# Patient Record
Sex: MALE | Race: WHITE | Employment: FULL TIME | URBAN - METROPOLITAN AREA
[De-identification: names, ages, dates, MRNs, and addresses within clinical notes are randomized per-mention and may not be internally consistent; named-entity substitution may affect disease eponyms.]

---

## 2019-04-01 ENCOUNTER — OFFICE VISIT (OUTPATIENT)
Dept: INTERNAL MEDICINE CLINIC | Age: 44
End: 2019-04-01

## 2019-04-01 VITALS
HEART RATE: 92 BPM | HEIGHT: 72 IN | SYSTOLIC BLOOD PRESSURE: 138 MMHG | DIASTOLIC BLOOD PRESSURE: 86 MMHG | OXYGEN SATURATION: 98 % | WEIGHT: 315 LBS | BODY MASS INDEX: 42.66 KG/M2

## 2019-04-01 DIAGNOSIS — Z13.220 SCREENING FOR CHOLESTEROL LEVEL: ICD-10-CM

## 2019-04-01 DIAGNOSIS — R73.9 HYPERGLYCEMIA: ICD-10-CM

## 2019-04-01 DIAGNOSIS — R53.83 FATIGUE, UNSPECIFIED TYPE: Primary | ICD-10-CM

## 2019-04-01 PROCEDURE — 99203 OFFICE O/P NEW LOW 30 MIN: CPT | Performed by: INTERNAL MEDICINE

## 2019-04-01 ASSESSMENT — PATIENT HEALTH QUESTIONNAIRE - PHQ9
SUM OF ALL RESPONSES TO PHQ QUESTIONS 1-9: 0
SUM OF ALL RESPONSES TO PHQ QUESTIONS 1-9: 0
SUM OF ALL RESPONSES TO PHQ9 QUESTIONS 1 & 2: 0
1. LITTLE INTEREST OR PLEASURE IN DOING THINGS: 0
2. FEELING DOWN, DEPRESSED OR HOPELESS: 0

## 2019-04-01 NOTE — PROGRESS NOTES
Ronda Garcia  YOB: 1975    Date of Service:  4/1/2019    Chief Complaint:      Chief Complaint   Patient presents with    [facility]       HPI:  Ronda Garcia is a 37 y.o. He's complain of feeling tired all the time. He does drink a lot of water during the day. He denies feeling thirsty or polyuria but is concern for diabetes. He has some ? Cholesterol growth on upper eye lids for many years and friend told him to get his cholesterol checked. He has not been watching his diet and been eating [employer]. Lab Results   Component Value Date    LABMICR YES 11/05/2016     Lab Results   Component Value Date     11/05/2016    K 4.3 11/05/2016     11/05/2016    CO2 25 11/05/2016    BUN 22 (H) 11/05/2016    CREATININE 1.1 11/05/2016    GLUCOSE 143 (H) 11/05/2016    CALCIUM 9.3 11/05/2016     No results found for: CHOL, TRIG, HDL, LDLCALC, LDLDIRECT  Lab Results   Component Value Date    ALT 17 11/05/2016    AST 14 (L) 11/05/2016     No results found for: TSH, T4FREE  Lab Results   Component Value Date    WBC 6.9 11/05/2016    HGB 16.3 11/05/2016    HCT 48.2 11/05/2016    MCV 85.4 11/05/2016     11/05/2016     No results found for: INR   No results found for: PSA   No results found for: LABURIC     There is no problem list on file for this patient. No Known Allergies  No outpatient medications have been marked as taking for the 4/1/19 encounter (Office Visit) with Izzy Cedeño MD.         Review of Systems: 14 systems were negative except of what was stated on HPI    Nursing note and vitals reviewed. Vitals:    04/01/19 1253   BP: 138/86   Pulse: 92   SpO2: 98%   Weight: (!) 358 lb (162.4 kg)   Height: 6' (1.829 m)     Wt Readings from Last 3 Encounters:   04/01/19 (!) 358 lb (162.4 kg)   11/05/16 280 lb (127 kg)     BP Readings from Last 3 Encounters:   04/01/19 138/86   11/05/16 138/78     Body mass index is 48.55 kg/m².   Constitutional: Patient appears well-developed and well-nourished. No distress. Head: Normocephalic and atraumatic. Neck: Normal range of motion. Neck supple. No thyroidmegaly. Cardiovascular: Normal rate, regular rhythm, normal heart sounds and intact distal pulses. Pulmonary/Chest: Effort normal and breath sounds normal. No stridor. No respiratory distress. No wheezes and no rales. Abdominal: Soft. Bowel sounds are normal. No distension and no mass. No tenderness. No rebound and no guarding. Musculoskeletal: No edema and no tenderness. Skin: No rash or erythema. Psychiatric: Normal mood and affect. Behavior is normal.     Assessment/Plan:  Marisol Houston was seen today for establish care. Diagnoses and all orders for this visit:    Fatigue, unspecified type  -     Hemoglobin A1C; Future  -     TSH with Reflex; Future    Hyperglycemia  -     Hemoglobin A1C; Future    Screening for cholesterol level  -     Lipid Panel; Future        Return if symptoms worsen or fail to improve.

## 2019-04-01 NOTE — PATIENT INSTRUCTIONS
[address]' financial assistance: [phone]  [facility]' financial assistance: [phone]    Family Size Annual Income Monthly Income Weekly Income  1  $23,760  $1,980   $456.92  2    32,040    2,670     616.15  3    40,320    3,360     775.38  4    48,600    4,050     934.62  5    56,880    4,740   1,093.85  6    65,160    5,430   1,253.08

## 2019-11-12 ENCOUNTER — HOSPITAL ENCOUNTER (EMERGENCY)
Age: 44
Discharge: HOME | End: 2019-11-12
Attending: EMERGENCY MEDICINE

## 2019-11-12 ENCOUNTER — APPOINTMENT (OUTPATIENT)
Dept: CT IMAGING | Age: 44
End: 2019-11-12

## 2019-11-12 VITALS
BODY MASS INDEX: 42.66 KG/M2 | OXYGEN SATURATION: 99 % | WEIGHT: 315 LBS | SYSTOLIC BLOOD PRESSURE: 174 MMHG | DIASTOLIC BLOOD PRESSURE: 80 MMHG | HEART RATE: 68 BPM | RESPIRATION RATE: 20 BRPM | TEMPERATURE: 97.9 F | HEIGHT: 72 IN

## 2019-11-12 DIAGNOSIS — N20.0 KIDNEY STONE: Primary | ICD-10-CM

## 2019-11-12 LAB
A/G RATIO: 1.1 (ref 1.1–2.2)
ALBUMIN SERPL-MCNC: 4 G/DL (ref 3.4–5)
ALP BLD-CCNC: 71 U/L (ref 40–129)
ALT SERPL-CCNC: 17 U/L (ref 10–40)
ANION GAP SERPL CALCULATED.3IONS-SCNC: 12 MMOL/L (ref 3–16)
AST SERPL-CCNC: 16 U/L (ref 15–37)
BACTERIA: ABNORMAL /HPF
BASOPHILS ABSOLUTE: 0.1 K/UL (ref 0–0.2)
BASOPHILS RELATIVE PERCENT: 0.9 %
BILIRUB SERPL-MCNC: 0.4 MG/DL (ref 0–1)
BILIRUBIN URINE: NEGATIVE
BLOOD, URINE: ABNORMAL
BUN BLDV-MCNC: 17 MG/DL (ref 7–20)
CALCIUM SERPL-MCNC: 8.9 MG/DL (ref 8.3–10.6)
CHLORIDE BLD-SCNC: 101 MMOL/L (ref 99–110)
CLARITY: CLEAR
CO2: 24 MMOL/L (ref 21–32)
COLOR: YELLOW
CREAT SERPL-MCNC: 1.2 MG/DL (ref 0.9–1.3)
EOSINOPHILS ABSOLUTE: 0.1 K/UL (ref 0–0.6)
EOSINOPHILS RELATIVE PERCENT: 0.6 %
GFR AFRICAN AMERICAN: >60
GFR NON-AFRICAN AMERICAN: >60
GLOBULIN: 3.7 G/DL
GLUCOSE BLD-MCNC: 149 MG/DL (ref 70–99)
GLUCOSE URINE: NEGATIVE MG/DL
HCT VFR BLD CALC: 48.4 % (ref 40.5–52.5)
HEMOGLOBIN: 16.6 G/DL (ref 13.5–17.5)
KETONES, URINE: ABNORMAL MG/DL
LEUKOCYTE ESTERASE, URINE: NEGATIVE
LYMPHOCYTES ABSOLUTE: 1 K/UL (ref 1–5.1)
LYMPHOCYTES RELATIVE PERCENT: 10.1 %
MCH RBC QN AUTO: 29.2 PG (ref 26–34)
MCHC RBC AUTO-ENTMCNC: 34.3 G/DL (ref 31–36)
MCV RBC AUTO: 85.2 FL (ref 80–100)
MICROSCOPIC EXAMINATION: YES
MONOCYTES ABSOLUTE: 0.4 K/UL (ref 0–1.3)
MONOCYTES RELATIVE PERCENT: 4.4 %
NEUTROPHILS ABSOLUTE: 8.5 K/UL (ref 1.7–7.7)
NEUTROPHILS RELATIVE PERCENT: 84 %
NITRITE, URINE: NEGATIVE
PDW BLD-RTO: 13.5 % (ref 12.4–15.4)
PH UA: 6 (ref 5–8)
PLATELET # BLD: 219 K/UL (ref 135–450)
PMV BLD AUTO: 8.1 FL (ref 5–10.5)
POTASSIUM REFLEX MAGNESIUM: 4.3 MMOL/L (ref 3.5–5.1)
PROTEIN UA: ABNORMAL MG/DL
RBC # BLD: 5.68 M/UL (ref 4.2–5.9)
RBC UA: ABNORMAL /HPF (ref 0–2)
SODIUM BLD-SCNC: 137 MMOL/L (ref 136–145)
SPECIFIC GRAVITY UA: 1.02 (ref 1–1.03)
TOTAL PROTEIN: 7.7 G/DL (ref 6.4–8.2)
URINE REFLEX TO CULTURE: YES
URINE TYPE: ABNORMAL
UROBILINOGEN, URINE: 0.2 E.U./DL
WBC # BLD: 10.1 K/UL (ref 4–11)
WBC UA: ABNORMAL /HPF (ref 0–5)

## 2019-11-12 PROCEDURE — 81001 URINALYSIS AUTO W/SCOPE: CPT

## 2019-11-12 PROCEDURE — 80053 COMPREHEN METABOLIC PANEL: CPT

## 2019-11-12 PROCEDURE — 96374 THER/PROPH/DIAG INJ IV PUSH: CPT

## 2019-11-12 PROCEDURE — 36415 COLL VENOUS BLD VENIPUNCTURE: CPT

## 2019-11-12 PROCEDURE — 85025 COMPLETE CBC W/AUTO DIFF WBC: CPT

## 2019-11-12 PROCEDURE — 96375 TX/PRO/DX INJ NEW DRUG ADDON: CPT

## 2019-11-12 PROCEDURE — 2580000003 HC RX 258: Performed by: EMERGENCY MEDICINE

## 2019-11-12 PROCEDURE — 96361 HYDRATE IV INFUSION ADD-ON: CPT

## 2019-11-12 PROCEDURE — 99284 EMERGENCY DEPT VISIT MOD MDM: CPT

## 2019-11-12 PROCEDURE — 6360000002 HC RX W HCPCS: Performed by: EMERGENCY MEDICINE

## 2019-11-12 PROCEDURE — 74176 CT ABD & PELVIS W/O CONTRAST: CPT

## 2019-11-12 RX ORDER — CEPHALEXIN 500 MG/1
500 CAPSULE ORAL 4 TIMES DAILY
Qty: 40 CAPSULE | Refills: 0 | Status: SHIPPED | OUTPATIENT
Start: 2019-11-12 | End: 2021-05-15

## 2019-11-12 RX ORDER — 0.9 % SODIUM CHLORIDE 0.9 %
1000 INTRAVENOUS SOLUTION INTRAVENOUS ONCE
Status: COMPLETED | OUTPATIENT
Start: 2019-11-12 | End: 2019-11-12

## 2019-11-12 RX ORDER — CEPHALEXIN 500 MG/1
500 CAPSULE ORAL EVERY 6 HOURS SCHEDULED
Status: DISCONTINUED | OUTPATIENT
Start: 2019-11-12 | End: 2019-11-12

## 2019-11-12 RX ORDER — KETOROLAC TROMETHAMINE 30 MG/ML
30 INJECTION, SOLUTION INTRAMUSCULAR; INTRAVENOUS ONCE
Status: COMPLETED | OUTPATIENT
Start: 2019-11-12 | End: 2019-11-12

## 2019-11-12 RX ORDER — ONDANSETRON 2 MG/ML
4 INJECTION INTRAMUSCULAR; INTRAVENOUS EVERY 30 MIN PRN
Status: DISCONTINUED | OUTPATIENT
Start: 2019-11-12 | End: 2019-11-12 | Stop reason: HOSPADM

## 2019-11-12 RX ORDER — IBUPROFEN 600 MG/1
600 TABLET ORAL EVERY 6 HOURS PRN
Qty: 15 TABLET | Refills: 0 | Status: SHIPPED | OUTPATIENT
Start: 2019-11-12 | End: 2021-05-15

## 2019-11-12 RX ADMIN — CEFTRIAXONE SODIUM 1 G: 1 INJECTION, POWDER, FOR SOLUTION INTRAMUSCULAR; INTRAVENOUS at 07:26

## 2019-11-12 RX ADMIN — KETOROLAC TROMETHAMINE 30 MG: 30 INJECTION, SOLUTION INTRAMUSCULAR; INTRAVENOUS at 06:56

## 2019-11-12 RX ADMIN — SODIUM CHLORIDE 1000 ML: 9 INJECTION, SOLUTION INTRAVENOUS at 07:26

## 2019-11-12 RX ADMIN — ONDANSETRON HYDROCHLORIDE 4 MG: 2 INJECTION, SOLUTION INTRAMUSCULAR; INTRAVENOUS at 06:56

## 2019-11-12 ASSESSMENT — ENCOUNTER SYMPTOMS
SHORTNESS OF BREATH: 0
VOMITING: 1
SORE THROAT: 0

## 2019-11-12 ASSESSMENT — PAIN DESCRIPTION - PAIN TYPE
TYPE: ACUTE PAIN

## 2019-11-12 ASSESSMENT — PAIN DESCRIPTION - LOCATION
LOCATION: FLANK

## 2019-11-12 ASSESSMENT — PAIN SCALES - GENERAL
PAINLEVEL_OUTOF10: 2
PAINLEVEL_OUTOF10: 0
PAINLEVEL_OUTOF10: 8
PAINLEVEL_OUTOF10: 8

## 2019-11-12 ASSESSMENT — PAIN DESCRIPTION - DESCRIPTORS
DESCRIPTORS: ACHING
DESCRIPTORS: ACHING;DISCOMFORT

## 2019-11-12 ASSESSMENT — PAIN DESCRIPTION - ORIENTATION
ORIENTATION: LEFT
ORIENTATION: LEFT

## 2021-05-15 ENCOUNTER — HOSPITAL ENCOUNTER (EMERGENCY)
Age: 46
Discharge: HOME | End: 2021-05-16
Attending: EMERGENCY MEDICINE
Payer: COMMERCIAL

## 2021-05-15 ENCOUNTER — APPOINTMENT (OUTPATIENT)
Dept: GENERAL RADIOLOGY | Age: 46
End: 2021-05-15
Payer: COMMERCIAL

## 2021-05-15 VITALS
WEIGHT: 315 LBS | HEART RATE: 89 BPM | BODY MASS INDEX: 42.66 KG/M2 | RESPIRATION RATE: 18 BRPM | TEMPERATURE: 98.4 F | HEIGHT: 72 IN | DIASTOLIC BLOOD PRESSURE: 64 MMHG | SYSTOLIC BLOOD PRESSURE: 133 MMHG | OXYGEN SATURATION: 97 %

## 2021-05-15 DIAGNOSIS — R07.9 INTERMITTENT CHEST PAIN: ICD-10-CM

## 2021-05-15 DIAGNOSIS — F17.200 SMOKER: ICD-10-CM

## 2021-05-15 DIAGNOSIS — R03.0 ELEVATED BLOOD PRESSURE READING: ICD-10-CM

## 2021-05-15 DIAGNOSIS — E66.01 OBESITY, MORBID (HCC): ICD-10-CM

## 2021-05-15 DIAGNOSIS — F41.1 ANXIETY STATE: Primary | ICD-10-CM

## 2021-05-15 DIAGNOSIS — R06.09 DYSPNEA ON EXERTION: ICD-10-CM

## 2021-05-15 PROCEDURE — 93005 ELECTROCARDIOGRAM TRACING: CPT | Performed by: EMERGENCY MEDICINE

## 2021-05-15 PROCEDURE — 6370000000 HC RX 637 (ALT 250 FOR IP): Performed by: EMERGENCY MEDICINE

## 2021-05-15 PROCEDURE — 99285 EMERGENCY DEPT VISIT HI MDM: CPT

## 2021-05-15 PROCEDURE — 71045 X-RAY EXAM CHEST 1 VIEW: CPT

## 2021-05-15 RX ORDER — ASPIRIN 81 MG/1
324 TABLET, CHEWABLE ORAL ONCE
Status: COMPLETED | OUTPATIENT
Start: 2021-05-15 | End: 2021-05-15

## 2021-05-15 RX ORDER — HYDROXYZINE PAMOATE 50 MG/1
50 CAPSULE ORAL ONCE
Status: COMPLETED | OUTPATIENT
Start: 2021-05-15 | End: 2021-05-15

## 2021-05-15 RX ADMIN — ASPIRIN 324 MG: 81 TABLET, CHEWABLE ORAL at 23:12

## 2021-05-15 RX ADMIN — HYDROXYZINE PAMOATE 50 MG: 50 CAPSULE ORAL at 23:13

## 2021-05-16 LAB
EKG ATRIAL RATE: 83 BPM
EKG DIAGNOSIS: NORMAL
EKG P AXIS: 29 DEGREES
EKG P-R INTERVAL: 144 MS
EKG Q-T INTERVAL: 360 MS
EKG QRS DURATION: 88 MS
EKG QTC CALCULATION (BAZETT): 423 MS
EKG R AXIS: 42 DEGREES
EKG T AXIS: 34 DEGREES
EKG VENTRICULAR RATE: 83 BPM

## 2021-05-16 PROCEDURE — 93010 ELECTROCARDIOGRAM REPORT: CPT | Performed by: INTERNAL MEDICINE

## 2021-05-16 NOTE — ED PROVIDER NOTES
CHIEF COMPLAINT  Anxiety (Pt brought in by [facility] after being arrested during a \"domestic violence call\". Pt sts \"my B/P is high, had an attack while they were there\")      HISTORY OF PRESENT ILLNESS  Ivette Cheatham is a 50 y.o. male presents to the ED brought in by EMS, with anxiety attack, reports there was a misunderstanding, he was arrested after a domestic violence call. Police here w/ him, patient is concerned his BP is high, had an episode of chest tightness/SOB, he states he is afraid they are going to put him in a cell alone and his anxiety will peak and raise his BP and he could die in there, symptoms now resolved since arrival- but he is still scared to go to senior living, patient has not seen a PCP in a long time, has not been on meds for BP, since he has gained a lot of weight a couple years ago, he noticed he sweats profusely and gets winded w/ minimal exertion, sometimes has chest pain as well, officer allowed his son at bedside, son agrees he noticed symptoms for at least 8 months or more, no known heart disease, has not had a cardiac workup, no hx of DVT/PE, no leg swelling or calf pain, No other complaints, modifying factors or associated symptoms. I have reviewed the following from the nursing documentation.     Past Medical History:   Diagnosis Date    Kidney stone      Past Surgical History:   Procedure Laterality Date    HERNIA REPAIR       Family History   Problem Relation Age of Onset    Heart Disease Mother     Diabetes Father     Heart Disease Father     Heart Disease Maternal Grandfather      Social History     Socioeconomic History    Marital status:      Spouse name: Not on file    Number of children: Not on file    Years of education: Not on file    Highest education level: Not on file   Occupational History    Occupation:     Tobacco Use    Smoking status: Former Smoker     Packs/day: 0.50     Types: Cigarettes     Quit date: 1/1/2019     Years since quittin.3    Smokeless tobacco: Current User     Types: Chew   Vaping Use    Vaping Use: Never used   Substance and Sexual Activity    Alcohol use: No    Drug use: Never    Sexual activity: Yes   Other Topics Concern    Not on file   Social History Narrative    Not on file     Social Determinants of Health     Financial Resource Strain:     Difficulty of Paying Living Expenses:    Food Insecurity:     Worried About Running Out of Food in the Last Year:     [address] in the Last Year:    Transportation Needs:     Lack of Transportation (Medical):  Lack of Transportation (Non-Medical):    Physical Activity:     Days of Exercise per Week:     Minutes of Exercise per Session:    Stress:     Feeling of Stress :    Social Connections:     Frequency of Communication with Friends and Family:     Frequency of Social Gatherings with Friends and Family:     Attends Nondenominational Services:     Active Member of Clubs or Organizations:     Attends Club or Organization Meetings:     Marital Status:    Intimate Partner Violence:     Fear of Current or Ex-Partner:     Emotionally Abused:     Physically Abused:     Sexually Abused:      No current facility-administered medications for this encounter. No current outpatient medications on file. No Known Allergies    REVIEW OF SYSTEMS  10 systems reviewed, pertinent positives per HPI otherwise noted to be negative. PHYSICAL EXAM  /64   Pulse 89   Temp 98.4 °F (36.9 °C) (Oral)   Resp 18   Ht 6' (1.829 m)   Wt (!) 350 lb (158.8 kg)   SpO2 97%   BMI 47.47 kg/m²   GENERAL APPEARANCE: Awake and alert. Cooperative. No acute distress, appears older than stated age, morbidly obese  HEAD: Normocephalic. Atraumatic. EYES: PERRL. EOM's grossly intact. ENT: Mucous membranes are moist. No stridor, airway patent  NECK: Supple. No rigidity  HEART: RRR. No murmurs  LUNGS: Respirations nonlabored. Lungs are CTAB. ABDOMEN: Soft.

## 2021-06-03 ENCOUNTER — VIRTUAL VISIT (OUTPATIENT)
Dept: INTERNAL MEDICINE CLINIC | Age: 46
End: 2021-06-03
Payer: COMMERCIAL

## 2021-06-03 ENCOUNTER — NURSE TRIAGE (OUTPATIENT)
Dept: OTHER | Facility: CLINIC | Age: 46
End: 2021-06-03

## 2021-06-03 ENCOUNTER — TELEPHONE (OUTPATIENT)
Dept: INTERNAL MEDICINE CLINIC | Age: 46
End: 2021-06-03

## 2021-06-03 DIAGNOSIS — I10 ESSENTIAL HYPERTENSION: Primary | ICD-10-CM

## 2021-06-03 PROCEDURE — G8427 DOCREV CUR MEDS BY ELIG CLIN: HCPCS | Performed by: INTERNAL MEDICINE

## 2021-06-03 PROCEDURE — 99213 OFFICE O/P EST LOW 20 MIN: CPT | Performed by: INTERNAL MEDICINE

## 2021-06-03 RX ORDER — LOSARTAN POTASSIUM 50 MG/1
50 TABLET ORAL DAILY
Qty: 30 TABLET | Refills: 0 | Status: SHIPPED | OUTPATIENT
Start: 2021-06-03 | End: 2021-06-16 | Stop reason: SDUPTHER

## 2021-06-03 ASSESSMENT — PATIENT HEALTH QUESTIONNAIRE - PHQ9
1. LITTLE INTEREST OR PLEASURE IN DOING THINGS: 0
SUM OF ALL RESPONSES TO PHQ QUESTIONS 1-9: 0
2. FEELING DOWN, DEPRESSED OR HOPELESS: 0
SUM OF ALL RESPONSES TO PHQ9 QUESTIONS 1 & 2: 0

## 2021-06-03 NOTE — PROGRESS NOTES
Date of Service:  6/3/2021    Chief Complaint:      Chief Complaint   Patient presents with    Hypertension       HPI:  Jaz Hatfield is a 50 y.o. Pursuant to the emergency declaration under the [facility] [place] [address] waiver authority and the [employer] Act, this Virtual  Video Visit was conducted, with patient's consent, to reduce the patient's risk of exposure to COVID-19 and provide continuity of care. Service is  provided through a video synchronous discussion virtually to substitute for in-person clinic visit with the patient being at home and Dr. Lizzie Navarrete being at home. Patient consent to the video visit. He complain of his BP being high 190/100 today and also high when was in the ER and have gained 50-60# in the past year. Lab Results   Component Value Date    LABMICR YES 11/12/2019     Lab Results   Component Value Date     11/12/2019    K 4.3 11/12/2019     11/12/2019    CO2 24 11/12/2019    BUN 17 11/12/2019    CREATININE 1.2 11/12/2019    GLUCOSE 149 (H) 11/12/2019    CALCIUM 8.9 11/12/2019     No results found for: CHOL, TRIG, HDL, LDLCALC, LDLDIRECT  Lab Results   Component Value Date    ALT 17 11/12/2019    AST 16 11/12/2019     No results found for: TSH, T4FREE  Lab Results   Component Value Date    WBC 10.1 11/12/2019    HGB 16.6 11/12/2019    HCT 48.4 11/12/2019    MCV 85.2 11/12/2019     11/12/2019     No results found for: INR   No results found for: PSA   No results found for: LABURIC     There is no problem list on file for this patient. No Known Allergies  No outpatient medications have been marked as taking for the 6/3/21 encounter (Virtual Visit) with Demian Estevez MD.         Review of Systems: 14 systems were negative except of what was stated on HPI    Nursing note and vitals reviewed. There were no vitals filed for this visit.   Wt Readings from Last 3 Encounters: 05/15/21 (!) 350 lb (158.8 kg)   11/12/19 (!) 330 lb (149.7 kg)   04/01/19 (!) 358 lb (162.4 kg)     BP Readings from Last 3 Encounters:   05/15/21 133/64   11/12/19 (!) 174/80   04/01/19 138/86     There is no height or weight on file to calculate BMI. Constitutional: Patient appears well-developed and well-nourished. No distress. Head: Normocephalic and atraumatic. Skin: No rash or erythema. Psychiatric: Normal mood and affect. Behavior is normal.       Assessment/Plan:  Roseanne Alvares was seen today for hypertension. Diagnoses and all orders for this visit:    Essential hypertension  Start  losartan (COZAAR) 50 MG tablet;  Take 1 tablet by mouth daily        Return June 16 at 8:50 Fasting Physical.

## 2021-06-03 NOTE — TELEPHONE ENCOUNTER
Received call from pre-service center [facility]) Dickson with Red Flag Complaint. Brief description of triage: Pt states he has been having high BP for at least the last 2 weeks and was seen at the ED who discharge instructions was to follow up with PCP. Pt states he recently has insurance and wanted to follow up [facility] PCP. Pt states he went to local fire station earlier and had BP checked and was told it was elevated. See assessment below. Triage indicates for patient to see PCP today. If unable to see PCP and symptoms are acute pt should be seen in the ED. Care advice provided, patient verbalizes understanding; denies any other questions or concerns; instructed to call back for any new or worsening symptoms. Writer provided warm transfer to [facility] for appointment scheduling. Attention Provider: Thank you for allowing me to participate in the care of your patient. The patient was connected to triage in response to information provided to the [facility]. Please do not respond through this encounter as the response is not directed to a shared pool. Reason for Disposition   Systolic BP >= 826 OR Diastolic >= 826    Answer Assessment - Initial Assessment Questions  1. BLOOD PRESSURE: \"What is the blood pressure? \" \"Did you take at least two measurements 5 minutes apart? \"      Pt had BP taken and systolic 725C and has been running high for at least 2 weeks. Pt had it taken earlier today. 2. ONSET: \"When did you take your blood pressure? \"      At least weeks    3. HOW: \"How did you obtain the blood pressure? \" (e.g., visiting nurse, automatic home BP monitor)    Automatic BP cuff [facility]    4. HISTORY: \"Do you have a history of high blood pressure? \"      Yes    5. MEDICATIONS: Juan Oliva you taking any medications for blood pressure? \" \"Have you missed any doses recently? \"      Pt is currently not taking medication    6. OTHER SYMPTOMS: \"Do you have any symptoms? \" (e.g., headache, chest pain, blurred vision, difficulty breathing, weakness)      Pt denies     7. PREGNANCY: \"Is there any chance you are pregnant? \" \"When was your last menstrual period? \"      na    Protocols used: HIGH BLOOD PRESSURE-ADULT-[place]

## 2021-06-16 ENCOUNTER — OFFICE VISIT (OUTPATIENT)
Dept: INTERNAL MEDICINE CLINIC | Age: 46
End: 2021-06-16
Payer: COMMERCIAL

## 2021-06-16 VITALS
WEIGHT: 315 LBS | DIASTOLIC BLOOD PRESSURE: 80 MMHG | HEIGHT: 72 IN | OXYGEN SATURATION: 98 % | SYSTOLIC BLOOD PRESSURE: 132 MMHG | BODY MASS INDEX: 42.66 KG/M2 | HEART RATE: 81 BPM

## 2021-06-16 DIAGNOSIS — Z11.4 SCREENING FOR HIV (HUMAN IMMUNODEFICIENCY VIRUS): ICD-10-CM

## 2021-06-16 DIAGNOSIS — F41.1 GAD (GENERALIZED ANXIETY DISORDER): ICD-10-CM

## 2021-06-16 DIAGNOSIS — I10 ESSENTIAL HYPERTENSION: ICD-10-CM

## 2021-06-16 DIAGNOSIS — Z11.59 ENCOUNTER FOR HEPATITIS C SCREENING TEST FOR LOW RISK PATIENT: ICD-10-CM

## 2021-06-16 DIAGNOSIS — Z00.00 ANNUAL PHYSICAL EXAM: Primary | ICD-10-CM

## 2021-06-16 LAB — HBA1C MFR BLD: 5.4 %

## 2021-06-16 PROCEDURE — 83036 HEMOGLOBIN GLYCOSYLATED A1C: CPT | Performed by: INTERNAL MEDICINE

## 2021-06-16 PROCEDURE — 99213 OFFICE O/P EST LOW 20 MIN: CPT | Performed by: INTERNAL MEDICINE

## 2021-06-16 PROCEDURE — G8427 DOCREV CUR MEDS BY ELIG CLIN: HCPCS | Performed by: INTERNAL MEDICINE

## 2021-06-16 PROCEDURE — G8417 CALC BMI ABV UP PARAM F/U: HCPCS | Performed by: INTERNAL MEDICINE

## 2021-06-16 PROCEDURE — 99396 PREV VISIT EST AGE 40-64: CPT | Performed by: INTERNAL MEDICINE

## 2021-06-16 PROCEDURE — 4004F PT TOBACCO SCREEN RCVD TLK: CPT | Performed by: INTERNAL MEDICINE

## 2021-06-16 RX ORDER — LOSARTAN POTASSIUM 50 MG/1
50 TABLET ORAL DAILY
Qty: 30 TABLET | Refills: 5 | Status: SHIPPED | OUTPATIENT
Start: 2021-06-16 | End: 2021-10-25 | Stop reason: DRUGHIGH

## 2021-06-16 SDOH — ECONOMIC STABILITY: FOOD INSECURITY: WITHIN THE PAST 12 MONTHS, THE FOOD YOU BOUGHT JUST DIDN'T LAST AND YOU DIDN'T HAVE MONEY TO GET MORE.: NEVER TRUE

## 2021-06-16 SDOH — ECONOMIC STABILITY: FOOD INSECURITY: WITHIN THE PAST 12 MONTHS, YOU WORRIED THAT YOUR FOOD WOULD RUN OUT BEFORE YOU GOT MONEY TO BUY MORE.: NEVER TRUE

## 2021-06-16 ASSESSMENT — SOCIAL DETERMINANTS OF HEALTH (SDOH): HOW HARD IS IT FOR YOU TO PAY FOR THE VERY BASICS LIKE FOOD, HOUSING, MEDICAL CARE, AND HEATING?: NOT HARD AT ALL

## 2021-06-16 NOTE — PROGRESS NOTES
[facility]  History and Physical  Deep Warren M.D. Seb Berumen  YOB: 1975    Date of Service:  6/16/2021    Chief Complaint:   Seb Berumen is a 50 y.o. male who presents for   Chief Complaint   Patient presents with    Annual Exam       Assessment/Plan:    Cale Rivas was seen today for annual exam.    Diagnoses and all orders for this visit:    Annual physical exam  -     CBC Auto Differential; Future  -     Lipid Panel; Future  -     Comprehensive Metabolic Panel; Future  -     POCT glycosylated hemoglobin (Hb A1C)    Encounter for hepatitis C screening test for low risk patient  -     HEPATITIS C ANTIBODY; Future    Screening for HIV (human immunodeficiency virus)  -     HIV Screen; Future    Essential hypertension  Increase losartan (COZAAR) 50 MG tablet; Take 1 tablet by mouth daily    ISAMAR (generalized anxiety disorder)  Start sertraline (ZOLOFT) 50 MG tablet; Take 1/2- 1 tablet by mouth daily        Return VV 1 month on anxiety. HPI: Here for Annual Physical and Follow up. He complains of increase in anxiety especially with driving for the past year. He can't breath and heart races at times when he's driving couple of times a week and does ease up when he stops. It may last 1-2 hrs once his anxiety starts. Hypertension:  Home blood pressure monitoring: Yes - 140-146/80-90 on Losartan 50 mg 1/2 qd. He is adherent to a low sodium diet. Patient denies chest pain, shortness of breath, headache, lightheadedness, blurred vision, peripheral edema, palpitations, dry cough and fatigue. Antihypertensive medication side effects: no medication side effects noted. Use of agents associated with hypertension: none.                                           Lab Results   Component Value Date    LABMICR YES 11/12/2019     Lab Results   Component Value Date     11/12/2019    K 4.3 11/12/2019     11/12/2019    CO2 24 11/12/2019    BUN 17 11/12/2019    CREATININE 1.2 2019    GLUCOSE 149 (H) 2019    CALCIUM 8.9 2019     No results found for: CHOL, TRIG, HDL, LDLCALC, LDLDIRECT  Lab Results   Component Value Date    ALT 17 2019    AST 16 2019     No results found for: TSH, T4FREE  Lab Results   Component Value Date    WBC 10.1 2019    HGB 16.6 2019    HCT 48.4 2019    MCV 85.2 2019     2019     No results found for: INR   No results found for: PSA   No results found for: LABURIC     Wt Readings from Last 3 Encounters:   21 (!) 371 lb (168.3 kg)   05/15/21 (!) 350 lb (158.8 kg)   19 (!) 330 lb (149.7 kg)     BP Readings from Last 3 Encounters:   21 132/80   05/15/21 133/64   19 (!) 174/80       There is no problem list on file for this patient.       No Known Allergies  No outpatient medications have been marked as taking for the 21 encounter (Office Visit) with Shweta Flynn MD.       Past Medical History:   Diagnosis Date    Kidney stone      Past Surgical History:   Procedure Laterality Date    HERNIA REPAIR       Family History   Problem Relation Age of Onset    Heart Disease Mother     Diabetes Father     Heart Disease Father     Heart Disease Maternal Grandfather      Social History     Socioeconomic History    Marital status:      Spouse name: Not on file    Number of children: Not on file    Years of education: Not on file    Highest education level: Not on file   Occupational History    Occupation:     Tobacco Use    Smoking status: Former Smoker     Packs/day: 0.50     Types: Cigarettes     Quit date: 2019     Years since quittin.4    Smokeless tobacco: Current User     Types: Chew   Vaping Use    Vaping Use: Never used   Substance and Sexual Activity    Alcohol use: No    Drug use: Never    Sexual activity: Yes   Other Topics Concern    Not on file   Social History Narrative    Not on file     Social Determinants of Health Financial Resource Strain: Low Risk     Difficulty of Paying Living Expenses: Not hard at all   Food Insecurity: No Food Insecurity    Worried About Running Out of Food in the Last Year: Never true    [place] of Food in the Last Year: Never true   Transportation Needs:     Lack of Transportation (Medical):  Lack of Transportation (Non-Medical):    Physical Activity:     Days of Exercise per Week:     Minutes of Exercise per Session:    Stress:     Feeling of Stress :    Social Connections:     Frequency of Communication with Friends and Family:     Frequency of Social Gatherings with Friends and Family:     Attends Church Services:     Active Member of Clubs or Organizations:     Attends Club or Organization Meetings:     Marital Status:    Intimate Partner Violence:     Fear of Current or Ex-Partner:     Emotionally Abused:     Physically Abused:     Sexually Abused:        Review of Systems:  A comprehensive review of systems was negative except for what was noted in the HPI. Physical Exam:   Vitals:    06/16/21 0849   BP: 132/80   Pulse: 81   SpO2: 98%   Weight: (!) 371 lb (168.3 kg)   Height: 6' (1.829 m)     Body mass index is 50.32 kg/m². Constitutional: He is oriented to person, place, and time. He appears well-developed and well-nourished. No distress. HEENT:   Head: Normocephalic and atraumatic. Right Ear: Tympanic membrane, external ear and ear canal normal.   Left Ear: Tympanic membrane, external ear and ear canal normal.   Mouth/Throat: Oropharynx is clear and moist and mucous membranes are normal. No oropharyngeal exudate or posterior oropharyngeal erythema. He has no cervical adenopathy. Eyes: Conjunctivae and extraocular motions are normal. Pupils are equal, round, and reactive to light. Neck:  Supple. No JVD present. Carotid bruit is not present. No mass and no thyromegaly present.    Cardiovascular: Normal rate, regular rhythm, normal heart sounds and intact that you are overweight        Recommended Immunizations    Immunization History   Administered Date(s) Administered    Tdap (Boostrix, Adacel) 01/01/2015    Influenza vaccine:  recommended every fall         Other Recommendations ·   See a dentist every 6 months  · Try to get at least 30 minutes of exercise 3-5 days per week  · Always wear a seat belt when traveling in a car  · Always wear a helmet when riding a bicycle or motorcycle  · When exposed to the sun, use a sunscreen that protects against both UVA and UVB radiation with an SPF of 30 or greater- reapply every 2 to 3 hours or after sweating, drying off with a towel, or swimming

## 2021-06-18 ENCOUNTER — HOSPITAL ENCOUNTER (OUTPATIENT)
Age: 46
Discharge: HOME | End: 2021-06-18
Payer: COMMERCIAL

## 2021-06-18 DIAGNOSIS — Z11.59 ENCOUNTER FOR HEPATITIS C SCREENING TEST FOR LOW RISK PATIENT: ICD-10-CM

## 2021-06-18 DIAGNOSIS — Z11.4 SCREENING FOR HIV (HUMAN IMMUNODEFICIENCY VIRUS): ICD-10-CM

## 2021-06-18 DIAGNOSIS — Z00.00 ANNUAL PHYSICAL EXAM: ICD-10-CM

## 2021-06-18 LAB
A/G RATIO: 1.2 (ref 1.1–2.2)
ALBUMIN SERPL-MCNC: 3.8 G/DL (ref 3.4–5)
ALP BLD-CCNC: 76 U/L (ref 40–129)
ALT SERPL-CCNC: 24 U/L (ref 10–40)
ANION GAP SERPL CALCULATED.3IONS-SCNC: 11 MMOL/L (ref 3–16)
AST SERPL-CCNC: 19 U/L (ref 15–37)
BASOPHILS ABSOLUTE: 0.1 K/UL (ref 0–0.2)
BASOPHILS RELATIVE PERCENT: 1.3 %
BILIRUB SERPL-MCNC: 0.8 MG/DL (ref 0–1)
BUN BLDV-MCNC: 12 MG/DL (ref 7–20)
CALCIUM SERPL-MCNC: 9 MG/DL (ref 8.3–10.6)
CHLORIDE BLD-SCNC: 100 MMOL/L (ref 99–110)
CHOLESTEROL, TOTAL: 165 MG/DL (ref 0–199)
CO2: 28 MMOL/L (ref 21–32)
CREAT SERPL-MCNC: 1 MG/DL (ref 0.9–1.3)
EOSINOPHILS ABSOLUTE: 0.2 K/UL (ref 0–0.6)
EOSINOPHILS RELATIVE PERCENT: 2.8 %
GFR AFRICAN AMERICAN: >60
GFR NON-AFRICAN AMERICAN: >60
GLOBULIN: 3.3 G/DL
GLUCOSE BLD-MCNC: 91 MG/DL (ref 70–99)
HCT VFR BLD CALC: 48.3 % (ref 40.5–52.5)
HDLC SERPL-MCNC: 32 MG/DL (ref 40–60)
HEMOGLOBIN: 16.6 G/DL (ref 13.5–17.5)
HEPATITIS C ANTIBODY INTERPRETATION: NORMAL
LDL CHOLESTEROL CALCULATED: 109 MG/DL
LYMPHOCYTES ABSOLUTE: 1.4 K/UL (ref 1–5.1)
LYMPHOCYTES RELATIVE PERCENT: 18.6 %
MCH RBC QN AUTO: 29.8 PG (ref 26–34)
MCHC RBC AUTO-ENTMCNC: 34.3 G/DL (ref 31–36)
MCV RBC AUTO: 86.9 FL (ref 80–100)
MONOCYTES ABSOLUTE: 0.6 K/UL (ref 0–1.3)
MONOCYTES RELATIVE PERCENT: 8.3 %
NEUTROPHILS ABSOLUTE: 5.3 K/UL (ref 1.7–7.7)
NEUTROPHILS RELATIVE PERCENT: 69 %
PDW BLD-RTO: 13.8 % (ref 12.4–15.4)
PLATELET # BLD: 220 K/UL (ref 135–450)
PMV BLD AUTO: 8 FL (ref 5–10.5)
POTASSIUM SERPL-SCNC: 4.5 MMOL/L (ref 3.5–5.1)
RBC # BLD: 5.56 M/UL (ref 4.2–5.9)
SODIUM BLD-SCNC: 139 MMOL/L (ref 136–145)
TOTAL PROTEIN: 7.1 G/DL (ref 6.4–8.2)
TRIGL SERPL-MCNC: 122 MG/DL (ref 0–150)
VLDLC SERPL CALC-MCNC: 24 MG/DL
WBC # BLD: 7.6 K/UL (ref 4–11)

## 2021-06-18 PROCEDURE — 80061 LIPID PANEL: CPT

## 2021-06-18 PROCEDURE — 86803 HEPATITIS C AB TEST: CPT

## 2021-06-18 PROCEDURE — 80053 COMPREHEN METABOLIC PANEL: CPT

## 2021-06-18 PROCEDURE — 86702 HIV-2 ANTIBODY: CPT

## 2021-06-18 PROCEDURE — 85025 COMPLETE CBC W/AUTO DIFF WBC: CPT

## 2021-06-18 PROCEDURE — 87390 HIV-1 AG IA: CPT

## 2021-06-18 PROCEDURE — 86701 HIV-1ANTIBODY: CPT

## 2021-06-18 PROCEDURE — 36415 COLL VENOUS BLD VENIPUNCTURE: CPT

## 2021-06-19 LAB
HIV AG/AB: NORMAL
HIV ANTIGEN: NORMAL
HIV-1 ANTIBODY: NORMAL
HIV-2 AB: NORMAL

## 2021-10-25 ENCOUNTER — OFFICE VISIT (OUTPATIENT)
Dept: INTERNAL MEDICINE CLINIC | Age: 46
End: 2021-10-25
Payer: COMMERCIAL

## 2021-10-25 VITALS
HEART RATE: 87 BPM | DIASTOLIC BLOOD PRESSURE: 84 MMHG | HEIGHT: 72 IN | BODY MASS INDEX: 42.66 KG/M2 | SYSTOLIC BLOOD PRESSURE: 148 MMHG | OXYGEN SATURATION: 98 % | WEIGHT: 315 LBS

## 2021-10-25 DIAGNOSIS — I10 ESSENTIAL HYPERTENSION: Primary | ICD-10-CM

## 2021-10-25 DIAGNOSIS — F41.1 GAD (GENERALIZED ANXIETY DISORDER): ICD-10-CM

## 2021-10-25 PROCEDURE — G8417 CALC BMI ABV UP PARAM F/U: HCPCS | Performed by: INTERNAL MEDICINE

## 2021-10-25 PROCEDURE — G8427 DOCREV CUR MEDS BY ELIG CLIN: HCPCS | Performed by: INTERNAL MEDICINE

## 2021-10-25 PROCEDURE — 99213 OFFICE O/P EST LOW 20 MIN: CPT | Performed by: INTERNAL MEDICINE

## 2021-10-25 PROCEDURE — G8484 FLU IMMUNIZE NO ADMIN: HCPCS | Performed by: INTERNAL MEDICINE

## 2021-10-25 PROCEDURE — 4004F PT TOBACCO SCREEN RCVD TLK: CPT | Performed by: INTERNAL MEDICINE

## 2021-10-25 RX ORDER — LOSARTAN POTASSIUM 100 MG/1
100 TABLET ORAL DAILY
Qty: 30 TABLET | Refills: 0 | Status: SHIPPED | OUTPATIENT
Start: 2021-10-25 | End: 2021-10-25 | Stop reason: ALTCHOICE

## 2021-10-25 RX ORDER — ATENOLOL 50 MG/1
50 TABLET ORAL DAILY
Qty: 30 TABLET | Refills: 0 | Status: SHIPPED | OUTPATIENT
Start: 2021-10-25 | End: 2021-12-15 | Stop reason: SDUPTHER

## 2021-10-25 NOTE — PROGRESS NOTES
Sarai Arshad  YOB: 1975    Date of Service:  10/25/2021    Chief Complaint:      Chief Complaint   Patient presents with    Hypertension       Assessment/Plan:  Jaylen Burton was seen today for hypertension. Diagnoses and all orders for this visit:    Essential hypertension  Start atenolol (TENORMIN) 50 MG tablet; Take 1 tablet by mouth daily    ISAMAR (generalized anxiety disorder)  Start  sertraline (ZOLOFT) 50 MG tablet; Take 1 tablet by mouth daily  Start  atenolol (TENORMIN) 50 MG tablet; Take 1 tablet by mouth daily    Continue Losartan 50 mg in the AM and start Atenolol 50 mg 1/2 tonight and then increase to 1 pill a day once you run out of losartan. Start sertraline 50 mg 1/2 pill tomorrow night for 6-8 days and if no side effects and still having anxiety, increase to 1 pill daily. Return 1 month on BP and anxiety. HPI:  Sarai Arshad is a 48 y.o. Anxiety:  especially with driving for the past year. He can't breath and heart races at times when he's driving couple of times a week and does ease up when he stops. It may last 1-2 hrs once his anxiety starts. He has not tried the zoloft started in June.     Hypertension:  Home blood pressure monitoring: Yes - 140-146/80-90 on Losartan 50 mg qd and thinks he needs a higher dose. He is adherent to a low sodium diet. Patient denies chest pain, shortness of breath, headache, lightheadedness, blurred vision, peripheral edema, palpitations, dry cough and fatigue. Antihypertensive medication side effects: no medication side effects noted. Use of agents associated with hypertension: none.       Lab Results   Component Value Date    LABA1C 5.4 06/16/2021    LABMICR YES 11/12/2019     Lab Results   Component Value Date     06/18/2021    K 4.5 06/18/2021     06/18/2021    CO2 28 06/18/2021    BUN 12 06/18/2021    CREATININE 1.0 06/18/2021    GLUCOSE 91 06/18/2021    CALCIUM 9.0 06/18/2021     Lab Results   Component Value Date

## 2021-11-11 ENCOUNTER — TELEPHONE (OUTPATIENT)
Dept: INTERNAL MEDICINE CLINIC | Age: 46
End: 2021-11-11

## 2021-11-11 NOTE — TELEPHONE ENCOUNTER
----- Message from Simone Lacey sent at 11/11/2021 10:18 AM EST -----  Subject: Message to Provider    QUESTIONS  Information for Provider? Pt was prescribed a new medication, that he does   not remember the name for. He was instructed to start taking and extra   half of a blood pressure pill a day and has been feeling much better. Pt   has not filled the prescription for the new medication as he does not to   take the medication.   ---------------------------------------------------------------------------  --------------  CALL BACK INFO  What is the best way for the office to contact you? OK to leave message on   voicemail  Preferred Call Back Phone Number? [phone]  ---------------------------------------------------------------------------  --------------  SCRIPT ANSWERS  Relationship to Patient?  Self

## 2021-11-11 NOTE — TELEPHONE ENCOUNTER
See message below. Currently taking Losartan 50 mg 1.5 tabs daily. Didn't start the atenolol. Wants to stay on 1.5 of Losartan. Would like refill sent to [address] Please advise.

## 2021-11-15 ENCOUNTER — VIRTUAL VISIT (OUTPATIENT)
Dept: INTERNAL MEDICINE CLINIC | Age: 46
End: 2021-11-15
Payer: COMMERCIAL

## 2021-11-15 DIAGNOSIS — I10 ESSENTIAL HYPERTENSION: Primary | ICD-10-CM

## 2021-11-15 DIAGNOSIS — F41.1 GAD (GENERALIZED ANXIETY DISORDER): ICD-10-CM

## 2021-11-15 PROCEDURE — 99441 PR PHYS/QHP TELEPHONE EVALUATION 5-10 MIN: CPT | Performed by: INTERNAL MEDICINE

## 2021-11-15 NOTE — PROGRESS NOTES
Date of Service:  11/15/2021    Chief Complaint:      Chief Complaint   Patient presents with    Hypertension       Assessment/Plan:  Michelle Wallis was seen today for hypertension. Diagnoses and all orders for this visit:    Essential hypertension    ISAMAR (generalized anxiety disorder)    start on Atenolol 25 mg qd today    Return keep 11/29 f/u. Total Time: minutes: 5-10 minutes      HPI:  Brendan Mi is a 48 y.o.    Brendan Mi is a 48 y.o. male evaluated via telephone on 11/15/2021 due to [place] 19 outbreak. Consent:  He and/or health care decision maker is aware that that he may receive a bill for this telephone service, depending on his insurance coverage, and has provided verbal consent to proceed: Yes    I AFFIRM/DO this is a Patient Initiated Episode with an Established Patient who has not had a related appointment within my department in the past 7 days or scheduled within the next 24 hours.       Hypertension:  Home blood pressure monitoring: Yes - 140-146/80-90 pulse 80's on Losartan 50 mg qd and have not started on Atenolol 50 mg yet due to fear of his HR going down thinks he needs a higher dose.  He is adherent to a low sodium diet. Patient denies chest pain, shortness of breath, headache, lightheadedness, blurred vision, peripheral edema, palpitations, dry cough and fatigue.  Antihypertensive medication side effects: no medication side effects noted.  Use of agents associated with hypertension: none.    Anxiety:  especially with driving for the past year.  He can't breath and heart races at times when he's driving couple of times a week and does ease up when he stops.  It may last 1-2 hrs once his anxiety starts. He has not tried the zoloft started in June.         Lab Results   Component Value Date    LABA1C 5.4 06/16/2021    LABMICR YES 11/12/2019     Lab Results   Component Value Date     06/18/2021    K 4.5 06/18/2021     06/18/2021    CO2 28 06/18/2021    BUN 12 06/18/2021 CREATININE 1.0 06/18/2021    GLUCOSE 91 06/18/2021    CALCIUM 9.0 06/18/2021     Lab Results   Component Value Date    CHOL 165 06/18/2021    TRIG 122 06/18/2021    HDL 32 06/18/2021    LDLCALC 109 06/18/2021     Lab Results   Component Value Date    ALT 24 06/18/2021    AST 19 06/18/2021     No results found for: TSH, T4FREE  Lab Results   Component Value Date    WBC 7.6 06/18/2021    HGB 16.6 06/18/2021    HCT 48.3 06/18/2021    MCV 86.9 06/18/2021     06/18/2021     No results found for: INR   No results found for: PSA   No results found for: [facility]     Patient Active Problem List   Diagnosis    Essential hypertension       No Known Allergies  Outpatient Medications Marked as Taking for the 11/15/21 encounter (Virtual Visit) with Walter York MD   Medication Sig Dispense Refill    sertraline (ZOLOFT) 50 MG tablet Take 1 tablet by mouth daily 30 tablet 0    atenolol (TENORMIN) 50 MG tablet Take 1 tablet by mouth daily 30 tablet 0         Review of Systems: 14 systems were negative except of what was stated on HPI    Nursing note and vitals reviewed. There were no vitals filed for this visit. Wt Readings from Last 3 Encounters:   10/25/21 (!) 371 lb (168.3 kg)   06/16/21 (!) 371 lb (168.3 kg)   05/15/21 (!) 350 lb (158.8 kg)     BP Readings from Last 3 Encounters:   10/25/21 (!) 148/84   06/16/21 132/80   05/15/21 133/64     There is no height or weight on file to calculate BMI. Constitutional: Patient sounded well and in no distress. Psychiatric: Normal mood on the phone.

## 2021-11-15 NOTE — TELEPHONE ENCOUNTER
Patient is calling to find out if his lasartan 50 mg was called into his pharmacy. Patient is out of medication completely. Please call patient and advise.

## 2021-11-15 NOTE — TELEPHONE ENCOUNTER
Have pt move his VV to today to discuss more since he wants to do something else different from our last discussion.

## 2021-12-15 ENCOUNTER — TELEMEDICINE (OUTPATIENT)
Dept: INTERNAL MEDICINE CLINIC | Age: 46
End: 2021-12-15
Payer: COMMERCIAL

## 2021-12-15 DIAGNOSIS — I10 ESSENTIAL HYPERTENSION: ICD-10-CM

## 2021-12-15 DIAGNOSIS — F41.1 GAD (GENERALIZED ANXIETY DISORDER): ICD-10-CM

## 2021-12-15 PROCEDURE — 99213 OFFICE O/P EST LOW 20 MIN: CPT | Performed by: INTERNAL MEDICINE

## 2021-12-15 PROCEDURE — G8427 DOCREV CUR MEDS BY ELIG CLIN: HCPCS | Performed by: INTERNAL MEDICINE

## 2021-12-15 RX ORDER — ATENOLOL 50 MG/1
50 TABLET ORAL DAILY
Qty: 30 TABLET | Refills: 0 | Status: SHIPPED | OUTPATIENT
Start: 2021-12-15 | End: 2022-01-13 | Stop reason: DRUGHIGH

## 2021-12-15 NOTE — PROGRESS NOTES
Pursuant to the emergency declaration under the [facility], [facility] waiver authority and the [employer] and [employer] Act, this Virtual  Video Visit was conducted, with patient's consent, to reduce the patient's risk of exposure to COVID-19 and provide continuity of care. Service is  provided through a video synchronous discussion virtually to substitute for in-person clinic visit with the patient being at home and Dr. Rolanda Herrmann being at home. Patient consent to the video visit. Date of Service:  12/15/2021    Chief Complaint:      Chief Complaint   Patient presents with    Hypertension    Anxiety       Assessment/Plan:    Sylvia Goncalves was seen today for hypertension and anxiety. Diagnoses and all orders for this visit:    ISAMAR (generalized anxiety disorder)  -     atenolol (TENORMIN) 50 MG tablet; Take 1 tablet by mouth daily  Start zoloft 50 mg 1/2-1 qhs    Essential hypertension  -     atenolol (TENORMIN) 50 MG tablet; Take 1 tablet by mouth daily      Return Jan 13 at 11:50 (10 mins) BP and anxiety. HPI:  Kj Williamson is a 48 y.o. Hypertension:  Home blood pressure monitoring: Yes - 111/74 126/73 115/84 121/63 118/73 pulse 60's on Atenolol 50 mg daily.  He is adherent to a low sodium diet. Patient denies chest pain, shortness of breath, headache, lightheadedness, blurred vision, peripheral edema, palpitations, dry cough and fatigue.  Antihypertensive medication side effects: no medication side effects noted.  Use of agents associated with hypertension: none.    Anxiety:  especially with driving for the past year.  He can't breath and heart races at times when he's driving couple of times a week and does ease up when he stops.  It may last 1-2 hrs once his anxiety starts.   He has not tried the zoloft started in June.     Lab Results   Component Value Date    LABA1C 5.4 06/16/2021    LABMICR YES 11/12/2019     Lab Results Component Value Date     06/18/2021    K 4.5 06/18/2021     06/18/2021    CO2 28 06/18/2021    BUN 12 06/18/2021    CREATININE 1.0 06/18/2021    GLUCOSE 91 06/18/2021    CALCIUM 9.0 06/18/2021     Lab Results   Component Value Date    CHOL 165 06/18/2021    TRIG 122 06/18/2021    HDL 32 06/18/2021    LDLCALC 109 06/18/2021     Lab Results   Component Value Date    ALT 24 06/18/2021    AST 19 06/18/2021     No results found for: TSH, T4FREE  Lab Results   Component Value Date    WBC 7.6 06/18/2021    HGB 16.6 06/18/2021    HCT 48.3 06/18/2021    MCV 86.9 06/18/2021     06/18/2021     No results found for: INR   No results found for: PSA   No results found for: [facility]     Patient Active Problem List   Diagnosis    Essential hypertension       No Known Allergies  Outpatient Medications Marked as Taking for the 12/15/21 encounter (Telemedicine) with Raf York MD   Medication Sig Dispense Refill    sertraline (ZOLOFT) 50 MG tablet Take 1 tablet by mouth daily 30 tablet 0    atenolol (TENORMIN) 50 MG tablet Take 1 tablet by mouth daily 30 tablet 0         Review of Systems: 14 systems were negative except of what was stated on HPI    Nursing note and vitals reviewed. There were no vitals filed for this visit. Wt Readings from Last 3 Encounters:   10/25/21 (!) 371 lb (168.3 kg)   06/16/21 (!) 371 lb (168.3 kg)   05/15/21 (!) 350 lb (158.8 kg)     BP Readings from Last 3 Encounters:   10/25/21 (!) 148/84   06/16/21 132/80   05/15/21 133/64     There is no height or weight on file to calculate BMI. Constitutional: Patient appears well-developed and well-nourished. No distress. Head: Normocephalic and atraumatic. Skin: No rash or erythema. Psychiatric: Normal mood and affect.  Behavior is normal.

## 2022-01-13 ENCOUNTER — TELEMEDICINE (OUTPATIENT)
Dept: INTERNAL MEDICINE CLINIC | Age: 47
End: 2022-01-13
Payer: COMMERCIAL

## 2022-01-13 DIAGNOSIS — I10 ESSENTIAL HYPERTENSION: Primary | ICD-10-CM

## 2022-01-13 DIAGNOSIS — F41.1 GAD (GENERALIZED ANXIETY DISORDER): ICD-10-CM

## 2022-01-13 PROCEDURE — G8427 DOCREV CUR MEDS BY ELIG CLIN: HCPCS | Performed by: INTERNAL MEDICINE

## 2022-01-13 PROCEDURE — 99213 OFFICE O/P EST LOW 20 MIN: CPT | Performed by: INTERNAL MEDICINE

## 2022-01-13 RX ORDER — ATENOLOL 25 MG/1
25 TABLET ORAL DAILY
Qty: 90 TABLET | Refills: 1 | Status: SHIPPED | OUTPATIENT
Start: 2022-01-13

## 2022-01-13 NOTE — PROGRESS NOTES
Pursuant to the emergency declaration under the [address] [facility], [facility] waiver authority and the [employer] and [employer] Act, this Virtual  Video Visit was conducted, with patient's consent, to reduce the patient's risk of exposure to COVID-19 and provide continuity of care. Service is  provided through a video synchronous discussion virtually to substitute for in-person clinic visit with the patient being at home and Dr. Surekha Crowder being at home. Patient consent to the video visit. Date of Service:  1/13/2022    Chief Complaint:      Chief Complaint   Patient presents with    Hypertension       Assessment/Plan:    Raul Middleton was seen today for hypertension. Diagnoses and all orders for this visit:    Essential hypertension  -     atenolol (TENORMIN) 25 MG tablet; Take 1 tablet by mouth daily    ISAMAR (generalized anxiety disorder)    Stable without medications     Return June 1 at 9:50 Fasting Physical.      HPI:  Meera Zheng is a 48 y.o. Hypertension:  Home blood pressure monitoring: Yes - 120/80's on Atenolol 50 mg 1/2 daily.  He is adherent to a low sodium diet.  Patient denies chest pain, shortness of breath, headache, lightheadedness, blurred vision, peripheral edema, palpitations, dry cough and fatigue.  Antihypertensive medication side effects: no medication side effects noted.  Use of agents associated with hypertension: none.    Anxiety:  stable and not want to take meds    Lab Results   Component Value Date    LABA1C 5.4 06/16/2021    LABMICR YES 11/12/2019     Lab Results   Component Value Date     06/18/2021    K 4.5 06/18/2021     06/18/2021    CO2 28 06/18/2021    BUN 12 06/18/2021    CREATININE 1.0 06/18/2021    GLUCOSE 91 06/18/2021    CALCIUM 9.0 06/18/2021     Lab Results   Component Value Date    CHOL 165 06/18/2021    TRIG 122 06/18/2021    HDL 32 06/18/2021    LDLCALC 109 06/18/2021     Lab Results   Component Value Date    ALT 24 06/18/2021    AST 19 06/18/2021     No results found for: TSH, T4FREE  Lab Results   Component Value Date    WBC 7.6 06/18/2021    HGB 16.6 06/18/2021    HCT 48.3 06/18/2021    MCV 86.9 06/18/2021     06/18/2021     No results found for: INR   No results found for: PSA   No results found for: [facility]     Patient Active Problem List   Diagnosis    Essential hypertension       No Known Allergies  Outpatient Medications Marked as Taking for the 1/13/22 encounter (Telemedicine) with Delma York MD   Medication Sig Dispense Refill    atenolol (TENORMIN) 50 MG tablet Take 1 tablet by mouth daily 30 tablet 0    sertraline (ZOLOFT) 50 MG tablet Take 1 tablet by mouth daily 30 tablet 0         Review of Systems: 14 systems were negative except of what was stated on HPI    Nursing note and vitals reviewed. There were no vitals filed for this visit. Wt Readings from Last 3 Encounters:   10/25/21 (!) 371 lb (168.3 kg)   06/16/21 (!) 371 lb (168.3 kg)   05/15/21 (!) 350 lb (158.8 kg)     BP Readings from Last 3 Encounters:   10/25/21 (!) 148/84   06/16/21 132/80   05/15/21 133/64     There is no height or weight on file to calculate BMI. Constitutional: Patient appears well-developed and well-nourished. No distress. Head: Normocephalic and atraumatic. Skin: No rash or erythema. Psychiatric: Normal mood and affect.  Behavior is normal.